# Patient Record
Sex: MALE | Race: WHITE | URBAN - METROPOLITAN AREA
[De-identification: names, ages, dates, MRNs, and addresses within clinical notes are randomized per-mention and may not be internally consistent; named-entity substitution may affect disease eponyms.]

---

## 2020-12-03 ENCOUNTER — OFFICE VISIT (OUTPATIENT)
Dept: URBAN - METROPOLITAN AREA CLINIC 27 | Facility: CLINIC | Age: 85
End: 2020-12-03
Payer: MEDICARE

## 2020-12-03 DIAGNOSIS — H35.3122 NONEXUDATIVE AGE-RELATED MACULAR DEGENERATION, LEFT EYE, INTERMEDIATE DRY STAGE: ICD-10-CM

## 2020-12-03 DIAGNOSIS — Z96.1 PRESENCE OF INTRAOCULAR LENS: ICD-10-CM

## 2020-12-03 DIAGNOSIS — H18.513 ENDOTHELIAL CORNEAL DYSTROPHY, BILATERAL: ICD-10-CM

## 2020-12-03 PROCEDURE — 67028 INJECTION EYE DRUG: CPT | Performed by: OPHTHALMOLOGY

## 2020-12-03 PROCEDURE — 99213 OFFICE O/P EST LOW 20 MIN: CPT | Performed by: OPHTHALMOLOGY

## 2020-12-03 PROCEDURE — 92134 CPTRZ OPH DX IMG PST SGM RTA: CPT | Performed by: OPHTHALMOLOGY

## 2020-12-03 ASSESSMENT — INTRAOCULAR PRESSURE
OS: 13
OD: 15

## 2020-12-03 NOTE — IMPRESSION/PLAN
Impression: Nonexudative age-related macular degeneration, left eye, intermediate dry stage: H35.3122. OS. Plan: Exam remains stable without evidence of progression and without evidence of IRF/SRF/SRH. The patient was advised to continue AREDS-2 multivitamins. The patient was also asked to continue [employer] monitoring, avoid smoking, wear UV protection, and call us immediately with any visual changes. Dilate OS q6 months.

## 2020-12-03 NOTE — IMPRESSION/PLAN
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
s/p DSAEK OS (Dr. Jessie Lockwood) Plan: Graft clear centrally. Cont topical steroid as directed.

## 2020-12-03 NOTE — IMPRESSION/PLAN
Impression: Exudative age-related macular degeneration, right eye, with active choroidal neovascularization: H35.3211. OD.
S/P Lucentis OD, last 8/11/2020 S/P Multi Avastin, last 1/28/13 Plan: Exam and OCT OD continue to reveal thin SRF and resolved SRH OD s/p Lucentis on 8/11/2020. VA stable, limited by GA. I have recommended anti-VEGF therapy today and maintaining the interval between treatments at 12 weeks. The patient understands that treatment may not improve vision but should reduce the risk of further visual loss. The patient elects to proceed with intravitreal Lucentis OD, which was performed in the clinic without complication. 

RTC 12 weeks OCT OD, re-eval for Lucentis

## 2021-02-25 ENCOUNTER — OFFICE VISIT (OUTPATIENT)
Dept: URBAN - METROPOLITAN AREA CLINIC 27 | Facility: CLINIC | Age: 86
End: 2021-02-25
Payer: MEDICARE

## 2021-02-25 PROCEDURE — 99213 OFFICE O/P EST LOW 20 MIN: CPT | Performed by: OPHTHALMOLOGY

## 2021-02-25 PROCEDURE — 67028 INJECTION EYE DRUG: CPT | Performed by: OPHTHALMOLOGY

## 2021-02-25 PROCEDURE — 92134 CPTRZ OPH DX IMG PST SGM RTA: CPT | Performed by: OPHTHALMOLOGY

## 2021-02-25 ASSESSMENT — INTRAOCULAR PRESSURE
OS: 9
OD: 12

## 2021-02-25 NOTE — IMPRESSION/PLAN
Impression: Exudative age-related macular degeneration, right eye, with active choroidal neovascularization: H35.3211. OD.
S/P Lucentis OD, last 12/3/2020 S/P Multi Avastin, last 1/28/13 Plan: Exam and OCT demonstrate mild, stable SRF and resolved SRH OD s/p Lucentis on 12/3/2020. VA stable at 20/40, limited by GA. I have recommended anti-VEGF therapy today and maintaining the interval between treatments at 12 weeks. The patient understands that treatment may not improve vision but should reduce the risk of further visual loss. The patient elects to proceed with intravitreal Lucentis OD, which was performed in the clinic without complication. 

RTC 12 weeks OCT OD, re-eval for Lucentis

## 2021-02-25 NOTE — IMPRESSION/PLAN
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
s/p DSAEK OS (Dr. Koby Krishna) Plan: Graft clear centrally. Cont topical steroid as directed.

## 2021-04-22 ENCOUNTER — OFFICE VISIT (OUTPATIENT)
Dept: URBAN - METROPOLITAN AREA CLINIC 27 | Facility: CLINIC | Age: 86
End: 2021-04-22
Payer: MEDICARE

## 2021-04-22 PROCEDURE — 92134 CPTRZ OPH DX IMG PST SGM RTA: CPT | Performed by: OPHTHALMOLOGY

## 2021-04-22 PROCEDURE — 67028 INJECTION EYE DRUG: CPT | Performed by: OPHTHALMOLOGY

## 2021-04-22 PROCEDURE — 99213 OFFICE O/P EST LOW 20 MIN: CPT | Performed by: OPHTHALMOLOGY

## 2021-04-22 ASSESSMENT — INTRAOCULAR PRESSURE
OD: 15
OS: 12

## 2021-04-22 NOTE — IMPRESSION/PLAN
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
s/p DSAEK OS (Dr. Nicholas Barrientos) Plan: Graft clear centrally.   Followed by Dr. Nicholas Barrientos

## 2021-04-22 NOTE — IMPRESSION/PLAN
Impression: Exudative age-related macular degeneration, right eye, with active choroidal neovascularization: H35.3211. OD.
S/P Lucentis OD, last 2/25/21 S/P Multi Avastin, last 1/28/13 Plan: Exam and OCT demonstrate mild, stable SRF OD s/p Lucentis on 2/25/21. No SRH. VA stable at 20/60, limited by GA. I have recommended anti-VEGF therapy today and maintaining the interval between treatments at 12 weeks. The patient understands that treatment may not improve vision but should reduce the risk of further visual loss. The patient elects to proceed with intravitreal Lucentis OD, which was performed in the clinic without complication. 

RTC 12 weeks OCT OD, re-eval for Lucentis

## 2021-07-22 ENCOUNTER — OFFICE VISIT (OUTPATIENT)
Dept: URBAN - METROPOLITAN AREA CLINIC 27 | Facility: CLINIC | Age: 86
End: 2021-07-22
Payer: MEDICARE

## 2021-07-22 DIAGNOSIS — H35.3211 EXUDATIVE AGE-RELATED MACULAR DEGENERATION, RIGHT EYE, WITH ACTIVE CHOROIDAL NEOVASCULARIZATION: Primary | ICD-10-CM

## 2021-07-22 DIAGNOSIS — H35.373 PUCKERING OF MACULA, BILATERAL: ICD-10-CM

## 2021-07-22 PROCEDURE — 92134 CPTRZ OPH DX IMG PST SGM RTA: CPT | Performed by: OPHTHALMOLOGY

## 2021-07-22 PROCEDURE — 67028 INJECTION EYE DRUG: CPT | Performed by: OPHTHALMOLOGY

## 2021-07-22 PROCEDURE — 99213 OFFICE O/P EST LOW 20 MIN: CPT | Performed by: OPHTHALMOLOGY

## 2021-07-22 ASSESSMENT — INTRAOCULAR PRESSURE
OD: 13
OS: 14

## 2021-07-22 NOTE — IMPRESSION/PLAN
Impression: Exudative age-related macular degeneration, right eye, with active choroidal neovascularization: H35.3211. OD.
S/P Lucentis OD, last 4/22/21 S/P Multi Avastin, last 1/28/13 Plan: Exam and OCT demonstrate mild, stable SRF OD s/p Lucentis on 4/22/21. No SRH. VA decreased from 20/60 to 20/70, limited by GA and subretinal fibrosis. I have recommended anti-VEGF therapy today and maintaining the interval between treatments at 12 weeks. The patient understands that treatment may not improve vision but should reduce the risk of further visual loss. The patient elects to proceed with intravitreal Lucentis OD, which was performed in the clinic without complication. 

RTC 12 weeks OCT OD, re-eval for Lucentis

## 2021-07-22 NOTE — IMPRESSION/PLAN
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
s/p DSAEK OS (Dr. Armond Barrera) Plan: Graft clear centrally.   Followed by Dr. Armond Barrera

## 2021-10-14 ENCOUNTER — OFFICE VISIT (OUTPATIENT)
Dept: URBAN - METROPOLITAN AREA CLINIC 27 | Facility: CLINIC | Age: 86
End: 2021-10-14
Payer: MEDICARE

## 2021-10-14 PROCEDURE — 92134 CPTRZ OPH DX IMG PST SGM RTA: CPT | Performed by: OPHTHALMOLOGY

## 2021-10-14 PROCEDURE — 67028 INJECTION EYE DRUG: CPT | Performed by: OPHTHALMOLOGY

## 2021-10-14 PROCEDURE — 99213 OFFICE O/P EST LOW 20 MIN: CPT | Performed by: OPHTHALMOLOGY

## 2021-10-14 ASSESSMENT — INTRAOCULAR PRESSURE
OD: 9
OS: 11

## 2021-10-14 NOTE — IMPRESSION/PLAN
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
s/p DSAEK OS (Dr. Alma Delia Cunningham) Plan: Graft clear centrally.   Followed by Dr. Alma Delia Cunningham

## 2021-10-14 NOTE — IMPRESSION/PLAN
Impression: Exudative age-related macular degeneration, right eye, with active choroidal neovascularization: H35.3211. OD.
S/P Lucentis OD, last 7/22/21 S/P Multi Avastin, last 1/28/13 Plan: Exam and OCT show stable SRF OD s/p Lucentis on 7/22/21. No SRH. VA decreased from 20/70 to 20/80, limited by GA and subretinal fibrosis. I have recommended anti-VEGF therapy today and maintaining the interval between treatments at 12 weeks. The patient understands that treatment may not improve vision but should reduce the risk of further visual loss. The patient elects to proceed with intravitreal Lucentis OD, which was performed in the clinic without complication. 

RTC 12 weeks OCT OD, re-eval for Lucentis

## 2022-01-06 ENCOUNTER — OFFICE VISIT (OUTPATIENT)
Dept: URBAN - METROPOLITAN AREA CLINIC 27 | Facility: CLINIC | Age: 87
End: 2022-01-06
Payer: MEDICARE

## 2022-01-06 PROCEDURE — 99213 OFFICE O/P EST LOW 20 MIN: CPT | Performed by: OPHTHALMOLOGY

## 2022-01-06 PROCEDURE — 92134 CPTRZ OPH DX IMG PST SGM RTA: CPT | Performed by: OPHTHALMOLOGY

## 2022-01-06 PROCEDURE — 67028 INJECTION EYE DRUG: CPT | Performed by: OPHTHALMOLOGY

## 2022-01-06 ASSESSMENT — INTRAOCULAR PRESSURE
OD: 16
OS: 14

## 2022-01-06 NOTE — IMPRESSION/PLAN
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
s/p DSAEK OS (Dr. Mary Badillo) Plan: Graft clear centrally.   Followed by Dr. Mary Badillo

## 2022-01-06 NOTE — IMPRESSION/PLAN
Impression: Exudative age-related macular degeneration, right eye, with active choroidal neovascularization: H35.3211. OD.
S/P Lucentis OD, last 10/14/21 S/P Multi Avastin, last 1/28/13 Plan: Exam and OCT show stable, mild SRF OD s/p Lucentis on 10/14/21. No SRH. VA improved from 20/80 to 20/60, limited by GA and subretinal fibrosis. I have recommended anti-VEGF therapy today and maintaining the interval between treatments at 12 weeks. The patient understands that treatment may not improve vision but should reduce the risk of further visual loss. The patient elects to proceed with intravitreal Lucentis OD, which was performed in the clinic without complication. 

RTC 12 weeks OCT OD, re-eval for Lucentis

## 2022-02-16 ENCOUNTER — OFFICE VISIT (OUTPATIENT)
Dept: URBAN - METROPOLITAN AREA CLINIC 27 | Facility: CLINIC | Age: 87
End: 2022-02-16
Payer: MEDICARE

## 2022-02-16 DIAGNOSIS — H43.813 VITREOUS DEGENERATION, BILATERAL: ICD-10-CM

## 2022-02-16 DIAGNOSIS — H04.123 DRY EYE SYNDROME OF BILATERAL LACRIMAL GLANDS: ICD-10-CM

## 2022-02-16 PROCEDURE — 67028 INJECTION EYE DRUG: CPT | Performed by: OPHTHALMOLOGY

## 2022-02-16 PROCEDURE — 92134 CPTRZ OPH DX IMG PST SGM RTA: CPT | Performed by: OPHTHALMOLOGY

## 2022-02-16 PROCEDURE — 92014 COMPRE OPH EXAM EST PT 1/>: CPT | Performed by: OPHTHALMOLOGY

## 2022-02-16 ASSESSMENT — INTRAOCULAR PRESSURE
OS: 16
OD: 19

## 2022-02-16 NOTE — IMPRESSION/PLAN
Impression: Macular Dgeneration wet, OD. S/P Lucentis OD, last 01/06/2022 S/P Multi Avastin, last 1/28/13 Plan: Exam and OCT reveal a small PED OD.
s/p Lucentis OD. On q3 month injections OD. Observe today.  

Return in 4-6 weeks for re-eval nvAMD (and new nvAMD OS), OCT OU with SM

## 2022-02-16 NOTE — IMPRESSION/PLAN
Impression: Macular Degeneration Wet, OS. Plan: The patient notes worsening VA OS. Exam and OCT reveal mild IRF OS. 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 Recommend Lucentis. RBA discussed. The patient elects Lucentis OS.

## 2022-03-31 ENCOUNTER — OFFICE VISIT (OUTPATIENT)
Dept: URBAN - METROPOLITAN AREA CLINIC 27 | Facility: CLINIC | Age: 87
End: 2022-03-31
Payer: MEDICARE

## 2022-03-31 DIAGNOSIS — H35.3221 EXDTVE AGE-REL MCLR DEGN, LEFT EYE, WITH ACTV CHRDL NEOVAS: ICD-10-CM

## 2022-03-31 PROCEDURE — 92134 CPTRZ OPH DX IMG PST SGM RTA: CPT | Performed by: OPHTHALMOLOGY

## 2022-03-31 ASSESSMENT — INTRAOCULAR PRESSURE
OS: 10
OD: 16

## 2022-03-31 NOTE — IMPRESSION/PLAN
Impression: Macular Degeneration Wet, OS. S/P Lucentis 2/16/22 Plan: Exam and OCT reveal stable mild IRF OS s/p Lucentis x 1 6 weeks ago. Recommend Lucentis. RBA discussed. The patient elects Lucentis OS.

## 2022-03-31 NOTE — IMPRESSION/PLAN
Impression: Exudative age-related macular degeneration, right eye, with active choroidal neovascularization: H35.3211. OD.
S/P Lucentis OD, last 02/16/22 ([facility]) S/P Multi Avastin, last 1/28/13 Plan: Exam and OCT demonstrate stable SRF OD s/p Lucentis on 02/16/22. No SRH. VA improved from 20/80 to 20/50, limited by GA and subretinal fibrosis. I have recommended anti-VEGF therapy today and maintaining the interval between treatments at 12 weeks. The patient understands that treatment may not improve vision but should reduce the risk of further visual loss. The patient elects to proceed with intravitreal Lucentis OD, which was performed in the clinic without complication. 

RTC 6 weeks OCT OU, re-eval for Lucentis

## 2022-03-31 NOTE — IMPRESSION/PLAN
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
s/p DSAEK OS (Dr. Osborne Later) Plan: Graft clear centrally.   Followed by Dr. Osborne Later

## 2022-05-12 ENCOUNTER — OFFICE VISIT (OUTPATIENT)
Dept: URBAN - METROPOLITAN AREA CLINIC 27 | Facility: CLINIC | Age: 87
End: 2022-05-12
Payer: MEDICARE

## 2022-05-12 DIAGNOSIS — Z96.1 PRESENCE OF INTRAOCULAR LENS: ICD-10-CM

## 2022-05-12 DIAGNOSIS — H35.3221 EXDTVE AGE-REL MCLR DEGN, LEFT EYE, WITH ACTV CHRDL NEOVAS: ICD-10-CM

## 2022-05-12 DIAGNOSIS — H35.373 PUCKERING OF MACULA, BILATERAL: ICD-10-CM

## 2022-05-12 DIAGNOSIS — H35.3211 EXUDATIVE AGE-RELATED MACULAR DEGENERATION, RIGHT EYE, WITH ACTIVE CHOROIDAL NEOVASCULARIZATION: Primary | ICD-10-CM

## 2022-05-12 DIAGNOSIS — H18.513 ENDOTHELIAL CORNEAL DYSTROPHY, BILATERAL: ICD-10-CM

## 2022-05-12 PROCEDURE — 92134 CPTRZ OPH DX IMG PST SGM RTA: CPT | Performed by: OPHTHALMOLOGY

## 2022-05-12 PROCEDURE — 67028 INJECTION EYE DRUG: CPT | Performed by: OPHTHALMOLOGY

## 2022-05-12 ASSESSMENT — INTRAOCULAR PRESSURE
OS: 10
OD: 10

## 2022-05-12 NOTE — IMPRESSION/PLAN
Impression: Macular Degeneration Wet, OS. S/P Lucentis 03/31//22 Plan: Exam and OCT reveal stable mild IRF OS s/p Lucentis x 2 6 weeks ago. Recommend Lucentis OS today. RBA discussed. The patient elects Lucentis OS - no complications encountered. Attending MD Mcmillan: see ED provider note for details

## 2022-05-12 NOTE — IMPRESSION/PLAN
Impression: Exudative age-related macular degeneration, right eye, with active choroidal neovascularization: H35.3211. OD.
S/P Lucentis OD, last 3/31/2022 ()
S/P Multi Avastin, last 1/28/13 Plan: Exam and OCT demonstrate stable SRF OD s/p Lucentis on 03/31/22. No SRH. VA stable at 20/60, limited by GA and subretinal fibrosis. I have recommended observation today and maintaining the interval between treatments at 12 weeks. The patient understands that treatment may not improve vision but should reduce the risk of further visual loss. The patient elects to proceed with intravitreal Lucentis OD, which was performed in the clinic without complication. 

RTC 6 weeks OCT OU, re-eval for Lucentis

## 2022-05-12 NOTE — IMPRESSION/PLAN
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
s/p DSAEK OS (Dr. Hollie Duval) Plan: Graft clear centrally.   Followed by Dr. Hollie Duval

## 2022-06-23 ENCOUNTER — OFFICE VISIT (OUTPATIENT)
Dept: URBAN - METROPOLITAN AREA CLINIC 27 | Facility: CLINIC | Age: 87
End: 2022-06-23
Payer: MEDICARE

## 2022-06-23 DIAGNOSIS — H35.3211 EXUDATIVE AGE-RELATED MACULAR DEGENERATION, RIGHT EYE, WITH ACTIVE CHOROIDAL NEOVASCULARIZATION: Primary | ICD-10-CM

## 2022-06-23 DIAGNOSIS — H35.373 PUCKERING OF MACULA, BILATERAL: ICD-10-CM

## 2022-06-23 DIAGNOSIS — Z96.1 PRESENCE OF INTRAOCULAR LENS: ICD-10-CM

## 2022-06-23 DIAGNOSIS — H35.3221 EXDTVE AGE-REL MCLR DEGN, LEFT EYE, WITH ACTV CHRDL NEOVAS: ICD-10-CM

## 2022-06-23 DIAGNOSIS — H18.513 ENDOTHELIAL CORNEAL DYSTROPHY, BILATERAL: ICD-10-CM

## 2022-06-23 PROCEDURE — 92134 CPTRZ OPH DX IMG PST SGM RTA: CPT | Performed by: OPHTHALMOLOGY

## 2022-06-23 PROCEDURE — 92014 COMPRE OPH EXAM EST PT 1/>: CPT | Performed by: OPHTHALMOLOGY

## 2022-06-23 ASSESSMENT — INTRAOCULAR PRESSURE
OD: 20
OS: 20

## 2022-06-23 NOTE — IMPRESSION/PLAN
Impression: Exudative age-related macular degeneration, right eye, with active choroidal neovascularization: H35.3211. OD.
S/P Lucentis OD, last 03/31/22 S/P Multi Avastin, last 1/28/13 Plan: Exam and OCT demonstrate stable SRF OD s/p Lucentis on 03/31/22. No SRH. VA remains stable, limited by GA and subretinal fibrosis. I have recommended observation today and maintaining the interval between treatments at 12 weeks. The patient understands that treatment may not improve vision but should reduce the risk of further visual loss. The patient elects to proceed with Lucentis 0.5 OD - no complications encountered. 

RTC 12 weeks for DFE/OCT OU re-eval for Lucentis

## 2022-06-23 NOTE — IMPRESSION/PLAN
Impression: Macular Degeneration Wet, OS. S/P Lucentis 05/12/22 Plan: Exam and OCT reveal stable mild IRF OS s/p Lucentis x 3 6 weeks ago. Recommend Lucentis OS today with extension to q12 week treatment interval. RBA discussed. The patient elects Lucentis OS - no complications encountered.

## 2022-06-23 NOTE — IMPRESSION/PLAN
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
s/p DSAEK OS (Dr. Lashawn Villareal) Plan: Graft clear centrally.   Followed by Dr. Lashawn Villareal

## 2022-09-15 ENCOUNTER — OFFICE VISIT (OUTPATIENT)
Dept: URBAN - METROPOLITAN AREA CLINIC 27 | Facility: CLINIC | Age: 87
End: 2022-09-15
Payer: MEDICARE

## 2022-09-15 DIAGNOSIS — H35.373 PUCKERING OF MACULA, BILATERAL: ICD-10-CM

## 2022-09-15 DIAGNOSIS — H35.3211 EXUDATIVE AGE-RELATED MACULAR DEGENERATION, RIGHT EYE, WITH ACTIVE CHOROIDAL NEOVASCULARIZATION: Primary | ICD-10-CM

## 2022-09-15 DIAGNOSIS — H35.3221 EXDTVE AGE-REL MCLR DEGN, LEFT EYE, WITH ACTV CHRDL NEOVAS: ICD-10-CM

## 2022-09-15 DIAGNOSIS — Z96.1 PRESENCE OF INTRAOCULAR LENS: ICD-10-CM

## 2022-09-15 DIAGNOSIS — H18.513 ENDOTHELIAL CORNEAL DYSTROPHY, BILATERAL: ICD-10-CM

## 2022-09-15 PROCEDURE — 99213 OFFICE O/P EST LOW 20 MIN: CPT | Performed by: OPHTHALMOLOGY

## 2022-09-15 PROCEDURE — 92134 CPTRZ OPH DX IMG PST SGM RTA: CPT | Performed by: OPHTHALMOLOGY

## 2022-09-15 ASSESSMENT — INTRAOCULAR PRESSURE
OS: 15
OD: 14

## 2022-09-15 NOTE — IMPRESSION/PLAN
Impression: Exudative age-related macular degeneration, right eye, with active choroidal neovascularization: H35.3211. OD.
S/P Lucentis OD, last 06/23/22 S/P Multi Avastin, last 1/28/13 Plan: Exam and OCT reveal mild, stable SRF OD s/p Lucentis on 06/23/22. No SRH. VA remains stable, limited by GA and subretinal fibrosis. I have recommended continuing anti-VEGF treatment and maintaining the interval between treatments at 12 weeks. The patient understands that treatment may not improve vision but should reduce the risk of further visual loss. The patient elects to proceed with Lucentis 0.5 OD - no complications encountered. 

RTC 12 weeks for DFE/OCT OU re-eval for Lucentis

## 2022-09-15 NOTE — IMPRESSION/PLAN
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
s/p DSAEK OS (Dr. Adriana Harmon) Plan: Graft clear centrally.   Followed by Dr. Adriana Harmon

## 2022-09-15 NOTE — IMPRESSION/PLAN
Impression: Macular Degeneration Wet, OS. S/P Lucentis 06/23/22 Plan: Exam and OCT reveal stable mild IRF OS s/p Lucentis x 4 12 weeks ago. Recommend Lucentis OS today, continue q12 week treatment interval. RBA discussed. The patient elects Lucentis OS - no complications encountered.

## 2022-12-15 ENCOUNTER — OFFICE VISIT (OUTPATIENT)
Dept: URBAN - METROPOLITAN AREA CLINIC 27 | Facility: CLINIC | Age: 87
End: 2022-12-15
Payer: MEDICARE

## 2022-12-15 DIAGNOSIS — Z96.1 PRESENCE OF INTRAOCULAR LENS: ICD-10-CM

## 2022-12-15 DIAGNOSIS — H18.513 ENDOTHELIAL CORNEAL DYSTROPHY, BILATERAL: ICD-10-CM

## 2022-12-15 DIAGNOSIS — H35.373 PUCKERING OF MACULA, BILATERAL: ICD-10-CM

## 2022-12-15 DIAGNOSIS — H35.3231 EXUDATIVE AGE-RELATED MACULAR DEGENERATION, BILATERAL, WITH ACTIVE CHOROIDAL NEOVASCULARIZATION: Primary | ICD-10-CM

## 2022-12-15 DIAGNOSIS — H35.3211 EXUDATIVE AGE-RELATED MACULAR DEGENERATION, RIGHT EYE, WITH ACTIVE CHOROIDAL NEOVASCULARIZATION: ICD-10-CM

## 2022-12-15 PROCEDURE — 92134 CPTRZ OPH DX IMG PST SGM RTA: CPT | Performed by: OPHTHALMOLOGY

## 2022-12-15 PROCEDURE — 99213 OFFICE O/P EST LOW 20 MIN: CPT | Performed by: OPHTHALMOLOGY

## 2022-12-15 ASSESSMENT — INTRAOCULAR PRESSURE
OS: 14
OD: 14

## 2022-12-15 NOTE — IMPRESSION/PLAN
Impression: Exudative age-related macular degeneration, bilateral, with active choroidal neovascularization: H35.3231. S/P Lucentis OU, last 09/15/22 S/P Multi Avastin OD, last 1/28/13 Plan: Exam and OCT demonstrate stable SRF OD and IFR OS s/p Lucentis on 09/15/22. No SRH. VA remains stable, limited by GA and subretinal fibrosis. I have recommended continuing anti-VEGF treatment OU and maintaining the interval between treatments at 12 weeks. The patient understands that treatment may not improve vision but should reduce the risk of further visual loss. The patient elects to proceed with Lucentis 0.5 OU - no complications encountered. 

RTC 12 weeks for DFE/OCT OU re-eval for Lucentis 0.5

## 2023-01-01 ENCOUNTER — OFFICE VISIT (OUTPATIENT)
Dept: URBAN - METROPOLITAN AREA CLINIC 27 | Facility: CLINIC | Age: 88
End: 2023-01-01
Payer: MEDICARE

## 2023-01-01 DIAGNOSIS — H35.373 PUCKERING OF MACULA, BILATERAL: ICD-10-CM

## 2023-01-01 DIAGNOSIS — Z96.1 PRESENCE OF INTRAOCULAR LENS: ICD-10-CM

## 2023-01-01 DIAGNOSIS — H35.3231 EXUDATIVE AGE-RELATED MACULAR DEGENERATION, BILATERAL, WITH ACTIVE CHOROIDAL NEOVASCULARIZATION: Primary | ICD-10-CM

## 2023-01-01 PROCEDURE — 92134 CPTRZ OPH DX IMG PST SGM RTA: CPT | Performed by: OPHTHALMOLOGY

## 2023-01-01 PROCEDURE — 99213 OFFICE O/P EST LOW 20 MIN: CPT | Performed by: OPHTHALMOLOGY

## 2023-01-01 ASSESSMENT — INTRAOCULAR PRESSURE
OS: 20
OD: 16

## 2023-08-24 ENCOUNTER — OFFICE VISIT (OUTPATIENT)
Dept: URBAN - METROPOLITAN AREA CLINIC 27 | Facility: CLINIC | Age: 88
End: 2023-08-24
Payer: MEDICARE

## 2023-08-24 DIAGNOSIS — H35.373 PUCKERING OF MACULA, BILATERAL: ICD-10-CM

## 2023-08-24 DIAGNOSIS — H35.3231 EXUDATIVE AGE-RELATED MACULAR DEGENERATION, BILATERAL, WITH ACTIVE CHOROIDAL NEOVASCULARIZATION: Primary | ICD-10-CM

## 2023-08-24 DIAGNOSIS — Z96.1 PRESENCE OF INTRAOCULAR LENS: ICD-10-CM

## 2023-08-24 PROCEDURE — 92134 CPTRZ OPH DX IMG PST SGM RTA: CPT | Performed by: OPHTHALMOLOGY

## 2023-08-24 PROCEDURE — 92014 COMPRE OPH EXAM EST PT 1/>: CPT | Performed by: OPHTHALMOLOGY

## 2023-08-24 ASSESSMENT — INTRAOCULAR PRESSURE
OD: 13
OS: 13